# Patient Record
(demographics unavailable — no encounter records)

---

## 2024-11-22 NOTE — END OF VISIT
[FreeTextEntry3] : This note was written by Minnie Bricñeo on 11/22/2024 actively solely Xavier Guevara M.D.  All medical record entries made by this scribe at my, Xavier Guevara M.D direction and personally dictated by me on 11/22/2024. I have personally reviewed the chart and agree that the record reflects my personal performance of the history, physical exam, assessment, and plan.

## 2024-11-22 NOTE — PLAN
[FreeTextEntry1] :  TRIXIE  is a 37 year old presenting for f/u on UTI sx and sono discussion.   - Blood work and sono reviewed with pt in detail. - Discussed dysfunctional uterine bleeding, discussed treatment options with pt.  - Discussed OCP vs IUDs, with side effects and benefits discussed for each.  - RTO in 3 weeks for annual and IUD insertion, sono after to confirm placement.

## 2024-11-22 NOTE — HISTORY OF PRESENT ILLNESS
[No] : Patient does not have concerns regarding sex [FreeTextEntry1] : 37 year old female LMP: 11/17/24 present for follow-up GYN visit for sono discussion. Pt reports she had the copper IUD in the past and reports it was very painful.   Of note, TVS done today shows:  uterus 7.9 cm, lining 3.3 mm, no polyps seen, right ovary normal 3.3cm, left ovary normal 3 cm, no cysts seen. [PapSmeardate] : 05/2020

## 2024-11-30 NOTE — PLAN
[FreeTextEntry1] : dysuria - urine culture sent - Rx for Macrobid given, Pt to take BID.   heavy menses - Referral given for pelvic US due to heavy bleeding.

## 2024-11-30 NOTE — HISTORY OF PRESENT ILLNESS
[FreeTextEntry1] :  37 year old LMP 11/17/24 presents for a follow up appointment regarding possible infection. Pt reports burning on urination started on 11/11/24. the Sx lessened after menses started on 11/17 but Pt is concerned about having an UTI. Pt also c/o very heavy menses. Her menses are regular, last 6 days, and are getting heavier with large clots, and cramping.

## 2024-11-30 NOTE — PHYSICAL EXAM
[10360] : A chaperone was present during the pelvic exam. [FreeTextEntry2] : MA [Appropriately responsive] : appropriately responsive [Alert] : alert [No Acute Distress] : no acute distress [No Lymphadenopathy] : no lymphadenopathy [Soft] : soft [Non-tender] : non-tender [Non-distended] : non-distended [No HSM] : No HSM [No Lesions] : no lesions [No Mass] : no mass [Oriented x3] : oriented x3

## 2024-11-30 NOTE — PHYSICAL EXAM
[[id number]] : A chaperone was present during the pelvic exam. [FreeTextEntry2] : MA [Appropriately responsive] : appropriately responsive [Alert] : alert [No Acute Distress] : no acute distress [No Lymphadenopathy] : no lymphadenopathy [Soft] : soft [Non-tender] : non-tender [Non-distended] : non-distended [No HSM] : No HSM [No Lesions] : no lesions [No Mass] : no mass [Oriented x3] : oriented x3

## 2024-11-30 NOTE — DISCUSSION/SUMMARY
[FreeTextEntry1] :  This note was written by Jo Logan on 11/21/2024 actively solely Dr. Xavier Greenberg M.D.   All medical record entries made by this scribe at my, Dr. Xavier Greenberg M.D. direction and personally dictated by me on 11/21/2024. I have personally reviewed the chart and agree that the record reflects my personal performance of the history, physical exam, assessment, and plan.